# Patient Record
Sex: FEMALE | Race: AMERICAN INDIAN OR ALASKA NATIVE
[De-identification: names, ages, dates, MRNs, and addresses within clinical notes are randomized per-mention and may not be internally consistent; named-entity substitution may affect disease eponyms.]

---

## 2020-08-11 ENCOUNTER — HOSPITAL ENCOUNTER (INPATIENT)
Dept: HOSPITAL 5 - TRG | Age: 30
LOS: 1 days | Discharge: HOME | End: 2020-08-12
Attending: OBSTETRICS & GYNECOLOGY | Admitting: OBSTETRICS & GYNECOLOGY
Payer: MEDICAID

## 2020-08-11 DIAGNOSIS — Z3A.40: ICD-10-CM

## 2020-08-11 DIAGNOSIS — J45.909: ICD-10-CM

## 2020-08-11 LAB
HCT VFR BLD CALC: 30.6 % (ref 30.3–42.9)
HGB BLD-MCNC: 8.7 GM/DL (ref 10.1–14.3)
MCHC RBC AUTO-ENTMCNC: 29 % (ref 30–34)
MCV RBC AUTO: 54 FL (ref 79–97)
PLATELET # BLD: 213 K/MM3 (ref 140–440)
RBC # BLD AUTO: 5.63 M/MM3 (ref 3.65–5.03)

## 2020-08-11 PROCEDURE — 86900 BLOOD TYPING SEROLOGIC ABO: CPT

## 2020-08-11 PROCEDURE — 85018 HEMOGLOBIN: CPT

## 2020-08-11 PROCEDURE — 85027 COMPLETE CBC AUTOMATED: CPT

## 2020-08-11 PROCEDURE — 36415 COLL VENOUS BLD VENIPUNCTURE: CPT

## 2020-08-11 PROCEDURE — 86850 RBC ANTIBODY SCREEN: CPT

## 2020-08-11 PROCEDURE — 85014 HEMATOCRIT: CPT

## 2020-08-11 PROCEDURE — 86592 SYPHILIS TEST NON-TREP QUAL: CPT

## 2020-08-11 PROCEDURE — 86901 BLOOD TYPING SEROLOGIC RH(D): CPT

## 2020-08-11 RX ADMIN — DOCUSATE SODIUM SCH MG: 100 CAPSULE, LIQUID FILLED ORAL at 11:23

## 2020-08-11 RX ADMIN — DOCUSATE SODIUM SCH MG: 100 CAPSULE, LIQUID FILLED ORAL at 22:02

## 2020-08-11 RX ADMIN — Medication SCH EACH: at 11:24

## 2020-08-11 RX ADMIN — IBUPROFEN SCH MG: 800 TABLET, FILM COATED ORAL at 11:24

## 2020-08-11 RX ADMIN — FERROUS SULFATE TAB 325 MG (65 MG ELEMENTAL FE) SCH MG: 325 (65 FE) TAB at 23:13

## 2020-08-11 NOTE — PROCEDURE NOTE
OB Delivery Note





- Delivery


Date of Delivery: 20


Surgeon: OLIVIA RINCON


Estimated blood loss: 200cc





- Vaginal


Delivery presentation: vertex


Delivery position: OA


Intrapartum events: none


Delivery induction: none


Delivery monitor: external FHT


Route of delivery: 


Delivery placenta: spontaneous (intact)


Delivery cord: nuchal cord (loose x1)


Episiotomy: none


Delivery laceration: none


Anesthesia: none





- Infant


  ** A


Apgar at 1 minute: 7


Apgar at 5 minutes: 8


Infant Gender: Male (6lb 13oz)

## 2020-08-11 NOTE — HISTORY AND PHYSICAL REPORT
History of Present Illness


Date of examination: 20


Date of admission: 


20 07:46





History of present illness: 





Precipice labor, patient of [facility], no prenatal records





Past History


Past Medical History: asthma


Past Surgical History: no surgical history





- Obstetrical History


: 1





Medications and Allergies


                                    Allergies











Allergy/AdvReac Type Severity Reaction Status Date / Time


 


latex Allergy  Swelling Verified 05/02/15 00:23











                                Home Medications











 Medication  Instructions  Recorded  Confirmed  Last Taken  Type


 


Budesonide/Formoterol Fumarate 10.2 gm PO PRN 05/28/15 05/28/15 2 Weeks Ago 

History





[Symbicort 80-4.5 Mcg Inhaler]    ~05/14/15 


 


Ferrous Sulfate 325 mg PO PRN 05/28/15 05/28/15 2 Days Ago History





    ~05/26/15 














- Vital Signs


Vital signs: 


                                   Vital Signs











Pulse BP


 


 103 H  128/61 


 


 20 07:49  20 07:49








                                        











Temp Pulse Resp BP Pulse Ox


 


    103 H     128/61    


 


    20 07:49     20 07:49   














- Physical Exam


Breasts: Positive: deferred


Lungs: Positive: Normal air movement


Abdomen: Positive: soft


Genitourinary (Female): Positive: normal external genitalia, normal perenium


Vulva: both: normal


Uterus: Positive: enlarged


Anus/Rectum: Positive: normal perianal skin


Extremities: Positive: normal.  Negative: tenderness, edema





Results


All other labs normal.








Assessment and Plan





- Patient Problems


(1) 40 weeks gestation of pregnancy


Current Visit: Yes   Status: Acute   





(2) Delivery normal


Current Visit: Yes   Status: Acute

## 2020-08-12 VITALS — DIASTOLIC BLOOD PRESSURE: 84 MMHG | SYSTOLIC BLOOD PRESSURE: 117 MMHG

## 2020-08-12 LAB
HCT VFR BLD CALC: 27.1 % (ref 30.3–42.9)
HGB BLD-MCNC: 7.8 GM/DL (ref 10.1–14.3)

## 2020-08-12 RX ADMIN — Medication SCH EACH: at 10:21

## 2020-08-12 RX ADMIN — FERROUS SULFATE TAB 325 MG (65 MG ELEMENTAL FE) SCH MG: 325 (65 FE) TAB at 10:21

## 2020-08-12 RX ADMIN — DOCUSATE SODIUM SCH MG: 100 CAPSULE, LIQUID FILLED ORAL at 10:21

## 2020-08-12 RX ADMIN — IBUPROFEN SCH MG: 800 TABLET, FILM COATED ORAL at 00:03

## 2020-08-12 RX ADMIN — IBUPROFEN SCH MG: 800 TABLET, FILM COATED ORAL at 10:21

## 2020-08-12 NOTE — DISCHARGE SUMMARY
Providers





- Providers


Date of Admission: 


20 07:46





Date of discharge: 20 (pt desires d/c)


Attending physician: 


JOSH WARD





Primary care physician: 


JOSH WARD








Hospitalization


Reason for admission: active labor


Delivery: 


Episiotomy: none


Laceration: none


Incision: normal


Other postpartum procedures: none


Postpartum complications: none


Discharge diagnosis: IUP at term delivered


 baby: male (will call to [facility])


Hospital course: 


uncomplicated vaginal delivery





Pt A&O No c/o pain. VSS FF below umb Lochia small Perineum intact H&H  Pt ha

s chronic anemia RX given for po iron No s/sx of anemia Doing well s/p vag 

delivery P: d/c today with instructions Pt and SO voice that they will bring 

their son to [facility] for circumcision. May f/u with us PP if desired.





Condition at discharge: Good


Disposition: DC-01 TO HOME OR SELFCARE





- Discharge Diagnoses


(1) Delivery normal


Status: Acute   Comment: RTO 4 weeks PP care   





Plan





- Discharge Medications


Prescriptions: 


Docusate Sodium [Colace] 100 mg PO BID PRN #60 capsule


 PRN Reason: Constipation


Lidocain2.5%/Prilocai2.5% [Emla] 5 gm TP ONCE #1 tube


Ferrous Sulfate [Feosol 325 MG tab] 325 mg PO BID #60 tablet





- Provider Discharge Summary


Activity: routine, no sex for 6 weeks, no heavy lifting 4 weeks, no strenuous 

exercise


Diet: routine


Instructions: routine


Additional instructions: 


[]  Smoking cessation referral if applicable(refer to patient education folder 

for contact #)


[]  Refer to [facility] Booklet








Call your doctor immediately for:


* Fever > 100.5


* Heavy vaginal bleeding ( >1 pad per hour)


* Severe persistent headache


* Shortness of breath


* Reddened, hot, painful area to leg or breast


* Drainage or odor from incision.





* Keep incision clean and dry at all times and follow doctor's instructions 

regarding bathing/showering











- Follow up plan


Follow up: 


JOSH WARD MD [Primary Care Provider] - 7 Days


ADOLPH MARTINEZ CNM [Advanced Practice Nurse] - 7 Days


(Congratulations!


Please call [phone] to schedule your postpartum visit in 4 weeks and your 

son's circumcision in 1 week. Bring the EMLA cream with you to his visit. Do NOT

use at home.


Take medications as prescribed. 


Call with any concerns.)